# Patient Record
Sex: FEMALE | Race: ASIAN | URBAN - METROPOLITAN AREA
[De-identification: names, ages, dates, MRNs, and addresses within clinical notes are randomized per-mention and may not be internally consistent; named-entity substitution may affect disease eponyms.]

---

## 2017-03-14 ENCOUNTER — COMMUNICATION - HEALTHEAST (OUTPATIENT)
Dept: ADMINISTRATIVE | Facility: CLINIC | Age: 37
End: 2017-03-14

## 2017-03-30 ENCOUNTER — PRENATAL OFFICE VISIT - HEALTHEAST (OUTPATIENT)
Dept: MIDWIFE SERVICES | Facility: CLINIC | Age: 37
End: 2017-03-30

## 2017-03-30 DIAGNOSIS — Z34.81 ENCOUNTER FOR SUPERVISION OF OTHER NORMAL PREGNANCY, FIRST TRIMESTER: ICD-10-CM

## 2017-03-30 LAB — HIV 1+2 AB+HIV1 P24 AG SERPL QL IA: NEGATIVE

## 2017-03-30 ASSESSMENT — MIFFLIN-ST. JEOR: SCORE: 1471.01

## 2017-03-31 LAB
HBV SURFACE AG SERPL QL IA: NEGATIVE
HCV AB SERPL QL IA: NEGATIVE
SYPHILIS RPR SCREEN - HISTORICAL: NORMAL

## 2017-04-06 ENCOUNTER — HOSPITAL ENCOUNTER (OUTPATIENT)
Dept: ULTRASOUND IMAGING | Facility: CLINIC | Age: 37
Discharge: HOME | End: 2017-04-06
Attending: ADVANCED PRACTICE MIDWIFE

## 2017-04-06 DIAGNOSIS — Z34.81 ENCOUNTER FOR SUPERVISION OF OTHER NORMAL PREGNANCY, FIRST TRIMESTER: ICD-10-CM

## 2017-04-19 ENCOUNTER — AMBULATORY - HEALTHEAST (OUTPATIENT)
Dept: OBGYN | Facility: CLINIC | Age: 37
End: 2017-04-19

## 2017-05-11 ENCOUNTER — PRENATAL OFFICE VISIT - HEALTHEAST (OUTPATIENT)
Dept: MIDWIFE SERVICES | Facility: CLINIC | Age: 37
End: 2017-05-11

## 2017-05-11 DIAGNOSIS — O09.512 ADVANCED MATERNAL AGE, 1ST PREGNANCY, SECOND TRIMESTER: ICD-10-CM

## 2017-05-11 DIAGNOSIS — Z34.82 ENCOUNTER FOR SUPERVISION OF OTHER NORMAL PREGNANCY, SECOND TRIMESTER: ICD-10-CM

## 2017-05-11 ASSESSMENT — MIFFLIN-ST. JEOR: SCORE: 1474.41

## 2017-05-30 ENCOUNTER — COMMUNICATION - HEALTHEAST (OUTPATIENT)
Dept: ADMINISTRATIVE | Facility: CLINIC | Age: 37
End: 2017-05-30

## 2017-06-16 ENCOUNTER — AMBULATORY - HEALTHEAST (OUTPATIENT)
Dept: MIDWIFE SERVICES | Facility: CLINIC | Age: 37
End: 2017-06-16

## 2017-06-16 DIAGNOSIS — Z34.82 ENCOUNTER FOR SUPERVISION OF OTHER NORMAL PREGNANCY, SECOND TRIMESTER: ICD-10-CM

## 2017-07-06 ENCOUNTER — PRENATAL OFFICE VISIT - HEALTHEAST (OUTPATIENT)
Dept: MIDWIFE SERVICES | Facility: CLINIC | Age: 37
End: 2017-07-06

## 2017-07-06 DIAGNOSIS — Z34.82 ENCOUNTER FOR SUPERVISION OF OTHER NORMAL PREGNANCY, SECOND TRIMESTER: ICD-10-CM

## 2017-07-06 DIAGNOSIS — N89.8 VAGINAL IRRITATION: ICD-10-CM

## 2017-07-06 ASSESSMENT — MIFFLIN-ST. JEOR: SCORE: 1524.31

## 2017-07-10 ENCOUNTER — RECORDS - HEALTHEAST (OUTPATIENT)
Dept: ADMINISTRATIVE | Facility: OTHER | Age: 37
End: 2017-07-10

## 2017-07-13 ENCOUNTER — AMBULATORY - HEALTHEAST (OUTPATIENT)
Dept: MIDWIFE SERVICES | Facility: CLINIC | Age: 37
End: 2017-07-13

## 2017-08-03 ENCOUNTER — PRENATAL OFFICE VISIT - HEALTHEAST (OUTPATIENT)
Dept: MIDWIFE SERVICES | Facility: CLINIC | Age: 37
End: 2017-08-03

## 2017-08-03 DIAGNOSIS — Z34.83 ENCOUNTER FOR SUPERVISION OF OTHER NORMAL PREGNANCY, THIRD TRIMESTER: ICD-10-CM

## 2017-08-03 DIAGNOSIS — O99.013 ANTEPARTUM ANEMIA, THIRD TRIMESTER: ICD-10-CM

## 2017-08-03 ASSESSMENT — MIFFLIN-ST. JEOR: SCORE: 1516.37

## 2017-08-04 LAB — SYPHILIS RPR SCREEN - HISTORICAL: NORMAL

## 2017-08-19 ENCOUNTER — HEALTH MAINTENANCE LETTER (OUTPATIENT)
Age: 37
End: 2017-08-19

## 2017-08-31 ENCOUNTER — PRENATAL OFFICE VISIT - HEALTHEAST (OUTPATIENT)
Dept: MIDWIFE SERVICES | Facility: CLINIC | Age: 37
End: 2017-08-31

## 2017-08-31 DIAGNOSIS — B37.31 YEAST VAGINITIS: ICD-10-CM

## 2017-08-31 DIAGNOSIS — Z34.83 ENCOUNTER FOR SUPERVISION OF OTHER NORMAL PREGNANCY, THIRD TRIMESTER: ICD-10-CM

## 2017-08-31 DIAGNOSIS — N89.8 VAGINAL IRRITATION: ICD-10-CM

## 2017-08-31 ASSESSMENT — MIFFLIN-ST. JEOR: SCORE: 1535.65

## 2017-09-14 ENCOUNTER — PRENATAL OFFICE VISIT - HEALTHEAST (OUTPATIENT)
Dept: MIDWIFE SERVICES | Facility: CLINIC | Age: 37
End: 2017-09-14

## 2017-09-14 DIAGNOSIS — Z34.83 ENCOUNTER FOR SUPERVISION OF OTHER NORMAL PREGNANCY, THIRD TRIMESTER: ICD-10-CM

## 2017-09-14 DIAGNOSIS — O99.013 ANTEPARTUM ANEMIA, THIRD TRIMESTER: ICD-10-CM

## 2017-09-28 ENCOUNTER — PRENATAL OFFICE VISIT - HEALTHEAST (OUTPATIENT)
Dept: MIDWIFE SERVICES | Facility: CLINIC | Age: 37
End: 2017-09-28

## 2017-09-28 DIAGNOSIS — Z34.83 ENCOUNTER FOR SUPERVISION OF OTHER NORMAL PREGNANCY, THIRD TRIMESTER: ICD-10-CM

## 2017-09-28 ASSESSMENT — MIFFLIN-ST. JEOR: SCORE: 1570.8

## 2017-09-29 ENCOUNTER — COMMUNICATION - HEALTHEAST (OUTPATIENT)
Dept: OBGYN | Facility: CLINIC | Age: 37
End: 2017-09-29

## 2017-10-05 ENCOUNTER — PRENATAL OFFICE VISIT - HEALTHEAST (OUTPATIENT)
Dept: MIDWIFE SERVICES | Facility: CLINIC | Age: 37
End: 2017-10-05

## 2017-10-05 DIAGNOSIS — O99.019 ANTEPARTUM ANEMIA: ICD-10-CM

## 2017-10-05 DIAGNOSIS — Z34.83 ENCOUNTER FOR SUPERVISION OF OTHER NORMAL PREGNANCY IN THIRD TRIMESTER: ICD-10-CM

## 2017-10-05 ASSESSMENT — MIFFLIN-ST. JEOR: SCORE: 1571.71

## 2017-10-06 ENCOUNTER — PRENATAL OFFICE VISIT - HEALTHEAST (OUTPATIENT)
Dept: MIDWIFE SERVICES | Facility: CLINIC | Age: 37
End: 2017-10-06

## 2017-10-06 DIAGNOSIS — O99.013 ANEMIA DURING PREGNANCY IN THIRD TRIMESTER: ICD-10-CM

## 2017-10-06 ASSESSMENT — MIFFLIN-ST. JEOR: SCORE: 1569.89

## 2017-10-07 LAB
BASOPHILS # BLD AUTO: 0 THOU/UL (ref 0–0.2)
BASOPHILS NFR BLD AUTO: 0 % (ref 0–2)
EOSINOPHIL # BLD AUTO: 0 THOU/UL (ref 0–0.4)
EOSINOPHIL NFR BLD AUTO: 0 % (ref 0–6)
ERYTHROCYTE [DISTWIDTH] IN BLOOD BY AUTOMATED COUNT: 13.7 % (ref 11–14.5)
HCT VFR BLD AUTO: 29.6 % (ref 35–47)
HGB BLD-MCNC: 9.4 G/DL (ref 12–16)
LYMPHOCYTES # BLD AUTO: 1.2 THOU/UL (ref 0.8–4.4)
LYMPHOCYTES NFR BLD AUTO: 16 % (ref 20–40)
MCH RBC QN AUTO: 27.3 PG (ref 27–34)
MCHC RBC AUTO-ENTMCNC: 31.8 G/DL (ref 32–36)
MCV RBC AUTO: 86 FL (ref 80–100)
MONOCYTES # BLD AUTO: 0.6 THOU/UL (ref 0–0.9)
MONOCYTES NFR BLD AUTO: 8 % (ref 2–10)
NEUTROPHILS # BLD AUTO: 5.8 THOU/UL (ref 2–7.7)
NEUTROPHILS NFR BLD AUTO: 76 % (ref 50–70)
PATH REPORT.MICROSCOPIC SPEC OTHER STN: ABNORMAL
PLATELET # BLD AUTO: 223 THOU/UL (ref 140–440)
PMV BLD AUTO: 10.9 FL (ref 8.5–12.5)
RBC # BLD AUTO: 3.44 MILL/UL (ref 3.8–5.4)
WBC: 7.7 THOU/UL (ref 4–11)

## 2017-10-09 ENCOUNTER — AMBULATORY - HEALTHEAST (OUTPATIENT)
Dept: OBGYN | Facility: CLINIC | Age: 37
End: 2017-10-09

## 2017-10-09 DIAGNOSIS — O99.013 ANEMIA DURING PREGNANCY IN THIRD TRIMESTER: ICD-10-CM

## 2017-10-09 LAB
LAB AP CHARGES (HE HISTORICAL CONVERSION): NORMAL
PATH REPORT.COMMENTS IMP SPEC: NORMAL
PATH REPORT.COMMENTS IMP SPEC: NORMAL
PATH REPORT.FINAL DX SPEC: NORMAL
PATH REPORT.MICROSCOPIC SPEC OTHER STN: NORMAL
PATH REPORT.RELEVANT HX SPEC: NORMAL

## 2017-10-10 ENCOUNTER — COMMUNICATION - HEALTHEAST (OUTPATIENT)
Dept: ADMINISTRATIVE | Facility: CLINIC | Age: 37
End: 2017-10-10

## 2017-10-10 ENCOUNTER — INFUSION - HEALTHEAST (OUTPATIENT)
Dept: INFUSION THERAPY | Facility: CLINIC | Age: 37
End: 2017-10-10

## 2017-10-10 DIAGNOSIS — O99.019 IRON DEFICIENCY ANEMIA OF PREGNANCY: ICD-10-CM

## 2017-10-10 DIAGNOSIS — D50.9 IRON DEFICIENCY ANEMIA OF PREGNANCY: ICD-10-CM

## 2017-10-12 ENCOUNTER — PRENATAL OFFICE VISIT - HEALTHEAST (OUTPATIENT)
Dept: MIDWIFE SERVICES | Facility: CLINIC | Age: 37
End: 2017-10-12

## 2017-10-12 DIAGNOSIS — O99.013 ANEMIA DURING PREGNANCY IN THIRD TRIMESTER: ICD-10-CM

## 2017-10-12 DIAGNOSIS — O99.019 ANTEPARTUM ANEMIA: ICD-10-CM

## 2017-10-12 DIAGNOSIS — O09.512 ADVANCED MATERNAL AGE, 1ST PREGNANCY, SECOND TRIMESTER: ICD-10-CM

## 2017-10-12 ASSESSMENT — MIFFLIN-ST. JEOR: SCORE: 1572.16

## 2017-10-13 ENCOUNTER — INFUSION - HEALTHEAST (OUTPATIENT)
Dept: INFUSION THERAPY | Facility: CLINIC | Age: 37
End: 2017-10-13

## 2017-10-13 DIAGNOSIS — O99.019 IRON DEFICIENCY ANEMIA OF PREGNANCY: ICD-10-CM

## 2017-10-13 DIAGNOSIS — D50.9 IRON DEFICIENCY ANEMIA OF PREGNANCY: ICD-10-CM

## 2017-10-16 ENCOUNTER — INFUSION - HEALTHEAST (OUTPATIENT)
Dept: INFUSION THERAPY | Facility: CLINIC | Age: 37
End: 2017-10-16

## 2017-10-16 DIAGNOSIS — O99.019 IRON DEFICIENCY ANEMIA OF PREGNANCY: ICD-10-CM

## 2017-10-16 DIAGNOSIS — D50.9 IRON DEFICIENCY ANEMIA OF PREGNANCY: ICD-10-CM

## 2017-10-18 ENCOUNTER — INFUSION - HEALTHEAST (OUTPATIENT)
Dept: INFUSION THERAPY | Facility: CLINIC | Age: 37
End: 2017-10-18

## 2017-10-18 DIAGNOSIS — O99.019 IRON DEFICIENCY ANEMIA OF PREGNANCY: ICD-10-CM

## 2017-10-18 DIAGNOSIS — D50.9 IRON DEFICIENCY ANEMIA OF PREGNANCY: ICD-10-CM

## 2017-10-19 ENCOUNTER — PRENATAL OFFICE VISIT - HEALTHEAST (OUTPATIENT)
Dept: MIDWIFE SERVICES | Facility: CLINIC | Age: 37
End: 2017-10-19

## 2017-10-19 DIAGNOSIS — O99.019 ANTEPARTUM ANEMIA: ICD-10-CM

## 2017-10-19 DIAGNOSIS — Z34.83 ENCOUNTER FOR SUPERVISION OF OTHER NORMAL PREGNANCY IN THIRD TRIMESTER: ICD-10-CM

## 2017-10-20 ENCOUNTER — INFUSION - HEALTHEAST (OUTPATIENT)
Dept: INFUSION THERAPY | Facility: CLINIC | Age: 37
End: 2017-10-20

## 2017-10-20 DIAGNOSIS — O99.019 IRON DEFICIENCY ANEMIA OF PREGNANCY: ICD-10-CM

## 2017-10-20 DIAGNOSIS — D50.9 IRON DEFICIENCY ANEMIA OF PREGNANCY: ICD-10-CM

## 2017-10-26 ENCOUNTER — AMBULATORY - HEALTHEAST (OUTPATIENT)
Dept: MIDWIFE SERVICES | Facility: CLINIC | Age: 37
End: 2017-10-26

## 2017-10-26 ENCOUNTER — PRENATAL OFFICE VISIT - HEALTHEAST (OUTPATIENT)
Dept: MIDWIFE SERVICES | Facility: CLINIC | Age: 37
End: 2017-10-26

## 2017-10-26 DIAGNOSIS — O48.0 POST-TERM PREGNANCY, 40-42 WEEKS OF GESTATION: ICD-10-CM

## 2017-10-26 ASSESSMENT — MIFFLIN-ST. JEOR: SCORE: 1578.51

## 2017-10-29 ENCOUNTER — AMBULATORY - HEALTHEAST (OUTPATIENT)
Dept: OBGYN | Facility: CLINIC | Age: 37
End: 2017-10-29

## 2017-10-29 ENCOUNTER — HOSPITAL ENCOUNTER (OUTPATIENT)
Dept: MEDSURG UNIT | Facility: CLINIC | Age: 37
Discharge: HOME | End: 2017-10-29
Attending: ADVANCED PRACTICE MIDWIFE | Admitting: MIDWIFE

## 2017-10-29 ENCOUNTER — HOSPITAL ENCOUNTER (OUTPATIENT)
Dept: ULTRASOUND IMAGING | Facility: CLINIC | Age: 37
Discharge: HOME | End: 2017-10-29
Attending: MIDWIFE

## 2017-10-29 DIAGNOSIS — O48.0 POST-TERM PREGNANCY, 40-42 WEEKS OF GESTATION: ICD-10-CM

## 2017-10-29 ASSESSMENT — MIFFLIN-ST. JEOR: SCORE: 1575.34

## 2017-11-01 ENCOUNTER — COMMUNICATION - HEALTHEAST (OUTPATIENT)
Dept: OBGYN | Facility: CLINIC | Age: 37
End: 2017-11-01

## 2017-11-01 ENCOUNTER — HOSPITAL ENCOUNTER (OUTPATIENT)
Dept: MEDSURG UNIT | Facility: CLINIC | Age: 37
Discharge: HOME | End: 2017-11-01
Attending: ADVANCED PRACTICE MIDWIFE | Admitting: MIDWIFE

## 2017-11-03 ENCOUNTER — HOME CARE/HOSPICE - HEALTHEAST (OUTPATIENT)
Dept: HOME HEALTH SERVICES | Facility: HOME HEALTH | Age: 37
End: 2017-11-03

## 2017-11-07 ENCOUNTER — COMMUNICATION - HEALTHEAST (OUTPATIENT)
Dept: OBGYN | Facility: CLINIC | Age: 37
End: 2017-11-07

## 2017-11-30 ENCOUNTER — COMMUNICATION - HEALTHEAST (OUTPATIENT)
Dept: MIDWIFE SERVICES | Facility: CLINIC | Age: 37
End: 2017-11-30

## 2017-12-14 ENCOUNTER — OFFICE VISIT - HEALTHEAST (OUTPATIENT)
Dept: MIDWIFE SERVICES | Facility: CLINIC | Age: 37
End: 2017-12-14

## 2017-12-14 ASSESSMENT — MIFFLIN-ST. JEOR: SCORE: 1489.15

## 2019-11-06 ENCOUNTER — HEALTH MAINTENANCE LETTER (OUTPATIENT)
Age: 39
End: 2019-11-06

## 2020-11-29 ENCOUNTER — HEALTH MAINTENANCE LETTER (OUTPATIENT)
Age: 40
End: 2020-11-29

## 2021-05-30 VITALS — HEIGHT: 66 IN | WEIGHT: 174.75 LBS | BODY MASS INDEX: 28.09 KG/M2

## 2021-05-30 VITALS — WEIGHT: 174 LBS | HEIGHT: 66 IN | BODY MASS INDEX: 27.97 KG/M2

## 2021-05-31 ENCOUNTER — RECORDS - HEALTHEAST (OUTPATIENT)
Dept: ADMINISTRATIVE | Facility: CLINIC | Age: 41
End: 2021-05-31

## 2021-05-31 VITALS — BODY MASS INDEX: 29.57 KG/M2 | WEIGHT: 184 LBS | HEIGHT: 66 IN

## 2021-05-31 VITALS — WEIGHT: 178 LBS | HEIGHT: 66 IN | BODY MASS INDEX: 28.61 KG/M2

## 2021-05-31 VITALS — BODY MASS INDEX: 31.47 KG/M2 | HEIGHT: 66 IN | WEIGHT: 195.8 LBS

## 2021-05-31 VITALS — WEIGHT: 197.7 LBS | HEIGHT: 66 IN | BODY MASS INDEX: 31.77 KG/M2

## 2021-05-31 VITALS — WEIGHT: 188.25 LBS | BODY MASS INDEX: 30.25 KG/M2 | HEIGHT: 66 IN

## 2021-05-31 VITALS — BODY MASS INDEX: 31.64 KG/M2 | WEIGHT: 196 LBS

## 2021-05-31 VITALS — BODY MASS INDEX: 29.85 KG/M2 | WEIGHT: 185.75 LBS | HEIGHT: 66 IN

## 2021-05-31 VITALS — BODY MASS INDEX: 31.55 KG/M2 | WEIGHT: 196.3 LBS | HEIGHT: 66 IN

## 2021-05-31 VITALS — WEIGHT: 196.2 LBS | BODY MASS INDEX: 31.53 KG/M2 | HEIGHT: 66 IN

## 2021-05-31 VITALS — BODY MASS INDEX: 31.66 KG/M2 | WEIGHT: 197 LBS | HEIGHT: 66 IN

## 2021-05-31 VITALS — HEIGHT: 66 IN | BODY MASS INDEX: 31.5 KG/M2 | WEIGHT: 196 LBS

## 2021-05-31 VITALS — BODY MASS INDEX: 30.18 KG/M2 | WEIGHT: 187 LBS

## 2021-06-01 ENCOUNTER — RECORDS - HEALTHEAST (OUTPATIENT)
Dept: ADMINISTRATIVE | Facility: CLINIC | Age: 41
End: 2021-06-01

## 2021-06-09 NOTE — PROGRESS NOTES
PRENATAL VISIT   FIRST OBSTETRICAL EXAM - OB    Assessment / Impression     First prenatal visit at approximately 10 weeks by uncertain LMP    Overweight, BMI 27. Discussed recommended weight gain of 15-25 lbs  Elevated EPDS of 19. Denies SI or HI; feels situational to early pregnancy.  History of depression; college, situational.     Plan:      *Ask partner's occupation at next visit*    -IOB labs drawn.  -Pt is not interested in drawing lead level. MDH screen negative.  -**Continue to monitor EPDS and signs of depression**  -Patient is  interested in waterbirth.  HIV and Hep C drawn today. Patient is interested in water birth.  -Reviewed prenatal care schedule.  -Optimal nutrition and weight gain discussed. Pregnancy weight gain of 15-25 lbs (BMI 25.0-29.9) encouraged.   -Anticipatory guidance for common pregnancy questions and concerns reviewed.   -Danger s/sx for this trimester reviewed with patient.  -Reviewed genetic screening options with patient, patient does not elect for first trimester screening at this time but will continue to consider in the coming weeks and will let us know in next 2-3 weeks if she desires genetic testing.  -IOB packet given and reviewed with patient.  -CNM services and hospital options reviewed; emergency and scheduling phone numbers given to patient.  -Return to clinic 4 weeks    Total time spent with patient: 40 minutes, >50% time spent counseling and coordinating care.    Subjective:    Kay Posada is a 36 y.o.  here today for her First Obstetrical Exam. Notes some nausea and vomiting but states that it isn't so significant that it impacts her day to day. States that she has been taking some antoinette supplements without much improvement. She states that she has throw up 6-8 times in this pregnancy so far.     Education level: Post-graduate; chiropractor  Occupation: Chiropractor  Partners name: Joseph  Partners occupation: Will ask at [place]    OB History     "Para Term  AB SAB TAB Ectopic Multiple Living   3 2 2 0 0 0 0 0 0 2      # Outcome Date GA Lbr Anthony/2nd Weight Sex Delivery Anes PTL Lv   3 Current            2 Term 12 41w4d  7 lb 8 oz (3.402 kg) M Water Birth None N Y   1 Term 09 41w5d  7 lb 6 oz (3.345 kg) F Vag-Spont None N Y          Expected Date of Delivery: Not found.    Past Medical History:   Diagnosis Date     Chicken pox as a child      [facility]; treated with medication. Situational     Past Surgical History:   Procedure Laterality Date     WISDOM TOOTH EXTRACTION      No anesthesia problems     Social History   Substance Use Topics     Smoking status: Former Smoker     Years: 0.50     Quit date: 2002     Smokeless tobacco: Never Used     Alcohol use No      Comment: None since finding out since pregnant     Current Outpatient Prescriptions   Medication Sig Dispense Refill     ascorbic acid (VITAMIN C) 1000 MG tablet Take 1,000 mg by mouth daily.       b complex vitamins tablet Take 1 tablet by mouth daily.       cholecalciferol, vitamin D3, 1,000 unit/drop Drop Take by mouth.       calcium-vitamin D (CALCIUM-VITAMIN D) 500 mg(1,250mg) -200 unit per tablet Take 1 tablet by mouth 2 (two) times a day with meals.       cholecalciferol, vitamin D3, 5,000 unit Tab Take 2,000 Units by mouth daily.        choline,magnesium salicylate 500 mg/5 mL Liqd liquid Take 500 mg by mouth 3 (three) times a day.       omega-3 fatty acids-fish oil (FISH OIL) 340-1,000 mg cap Take by mouth.       prenatal #115-iron-folic acid 29 mg iron- 1 mg Chew Chew daily.       No current facility-administered medications for this visit.      No Known Allergies          Pregnancy Risk Factors: Age is <18 or >35    EPDS score today: 19. States that she is reflecting over the last week and feeling nauseated, fatigued and \"just not herself\" in early pregnancy. She states that she does not feel depressed and denies any SI or HI. Feels that this early " "pregnancy nausea and fatigue is really affecting her. Patient also noting that this pregnancy wasn't necessarily planned although Joseph and her are happy. She feels as though she is still 'catching up' feeling pregnant with this third baby. Notes a history of depression in college in which she took medications but states it was more situational in college and hasn't felt depressed since that time. Denies any history of postpartum depression.    Review of Systems  General:  Denies problem  Eyes: Denies problem  Ears/Nose/Throat: Denies problem  Cardiovascular: Denies problem  Respiratory:  Denies problem  Gastrointestinal:  Denies problem  Genitourinary: Denies problem  Musculoskeletal:  Denies problem  Skin: Denies problem  Neurologic: Denies problem  Psychiatric: Denies problem  Endocrine: Denies problem  Heme/Lymphatic: Denies problem   Allergic/Immunologic: Denies problem       Objective:   Objective    Vitals:    03/30/17 1044   BP: 104/62   Pulse: 72   Weight: 174 lb (78.9 kg)   Height: 5' 6\" (1.676 m)     Physical Exam:  General Appearance: Alert, cooperative, no distress, appears stated age  Head: Normocephalic, without obvious abnormality, atraumatic  Eyes: Conjunctiva/corneas clear, does wear corrective lenses  Neck: Supple, symmetrical, trachea midline, no adenopathy  Thyroid: not enlarged, symmetric, no tenderness/mass/nodules  Back: Symmetric, no curvature, ROM normal, no CVA tenderness  Lungs: Clear to auscultation bilaterally, respirations unlabored  Heart: Regular rate and rhythm, S1 and S2 normal, no murmur, rub, or gallop  Breasts: No breast masses, tenderness, asymmetry, or nipple discharge. Nipples are everted.  Abdomen: Soft, non-tender, bowel sounds active all four quadrants, no masses.   FHT: Unable to perform today at 8-9 week gestation.   Vulva:  No sign of lesions or condyloma, normal hair distribution  Vagina: pink, normal rugae, no abnormal discharge  Cervix: Long/thick/closed, no lesions " or inflammation noted, negative CMT with exam  Uterus: Anterior position, non tender, enlarged approximately 9 weeks size  Adnexa: No masses appreciated, non-tender with palpation  Pelvic spines:AVERAGE  Sacrum:ANTERIOR  Suprapubic Arch:NORMAL  Pelvis type:gynecoid            Extremities: Extremities normal, atraumatic, no cyanosis or edema  Skin: Skin color, texture, turgor normal, no rashes or lesions  Lymph nodes: Cervical, supraclavicular, and axillary nodes normal  Neurologic: Alert and oriented x 3.    Lab:   Results for orders placed or performed in visit on 12/04/14   Lipid Profile   Result Value Ref Range    Triglycerides 122 <=149 mg/dL    Cholesterol 214 (H) <=199 mg/dL    LDL Calculated 121 0 - 129 mg/dL    HDL Cholesterol 69 >=40 mg/dL   Vitamin D, Total (25-Hydroxy)   Result Value Ref Range    Vitamin D, Total (25-Hydroxy) 17.8 (L) 30.0 - 80.0 ng/mL   PAP - Gynecologic Cytology (PAP Smear)   Result Value Ref Range    Case Report       Gynecologic Cytology Report                       Case: [id number]                                   --------------------------------------------------------------------------------------------------  Authorizing Provider:  Chelsie Smith CNM       Ordering Provider:                                Ordering Location:     [facility]  Collected:           12/4/2014 1340               First Screen:          HALEY Walton       Received:            12/5/2014 0909                                      (ASCP)                                                                                                                                                                           Specimen:    SUREPATH PAP, SCRN REGARDLESS, Endocervical/cervical                                       Interpretation       Negative for squamous intraepithelial lesion or malignancy    Result Flag Normal Normal    Specimen Adequacy       Satisfactory for evaluation,  endocervical/transformation zone component present    HPV Reflex? Yes regardless of result     HIGH RISK No     Abnormal Bleeding No     Pt Status health maintenance     Birth Control/Hormones None     Prev Abn Date/Dx None     Cervical Appearance parous os, clean    HPV Cascade (PCR)   Result Value Ref Range    Interpretation No HPV Type(s) Detected No HPV Type(s) Detected, No High Risk HPV Type(s) Detected, DNA Quantity Not Sufficient     Sedrick Gilman MD, [facility]         AJAY Garsia, STEPHANIE   3/30/2017 11:54 AM

## 2021-06-10 NOTE — PROGRESS NOTES
Kay is here with her son and 2 other children she is caring for. She is slowly feeling much better 'and not crying nearly so much.' PHQ 9 today = 6. She is working PT as a chiropractor and doing  PT. I offered QS which she will consider but didn't desire doing it today. We reviewed her IOB labs. She is taking a B complex vitamin and till try to incorporate her PNV and choline soon. Only occasional days with N/V now. We reviewed her dating US and because she was unsure of exact LMP she feels this is the most accurate EDC. I offered an US with MPP for AMA which she accepted and a referral will be placed. She may check with insurance to make sure it is covered. Probably early quickening.

## 2021-06-11 NOTE — PROGRESS NOTES
STEPHANIE has called Kay to ask if she wants [facility] Level 2 US. Left messages and calls not returned.  AJAY Paredes,STEPHANIE

## 2021-06-11 NOTE — PROGRESS NOTES
Kay is here with 9 mo old Ethan, her friend's baby who she cares for. States 2 weeks ago she finally started feeling well with no recent emesis. She plans to return to taking her prenatal supplements again. (which she hasn't been taking). 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 She then travels to [place] every other week-end and works there 1 1/2 days. [facility] she finally connected with [facility] and her Level 2 US is scheduled 7/10/17. She may take her family and they plan for gender to be a surprise. She is having mild stress incontinence with coughing and is wearing pads. Feels some skin irritation and we sent a wet prep today. Labia slight external genitalia inflammation. Feels slight pelvic pressure so cervix checked: Firm, posterior, LTC, no bulging EVA. Slight anterior relaxation with bearing down. Encouraged Kegels. Baby is active. Slightly S>D today. Reviewed weight gain with 11# gain past 2 months. On target with BMI. NV: 1 hr GCT, Hepatitis C, Hgb, RPR. Info given: TDap, vaccines,  and 1 hr GCT. Wet prep = yeast. Kay notified and [facility] usually treats yeast with garlic cloves vaginally which she will use as well as decrease dietary sugar.

## 2021-06-12 NOTE — PROGRESS NOTES
Kay is here for CARLOS visit. 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 States she has eaten really healhily for the past month and then increased sugar in the last week.  She started having external genital itching x 3 days and it is quite bothersome. May try garlic. Wet prep positive yeast and I called and recommended Monistat 7 as her EG are quite inflamed and discharge is chunky white.  Taking Floradix and purchased chlorophyll. Discussed hemoglobin should be higher than 9.4 when she desires WB. She will transition to MPW - encouraged regular appointments.

## 2021-06-12 NOTE — PROGRESS NOTES
Kay is here with her children. She is tired and a little lightheaded. She is doing the  1 hr GCT, RPR and hemoglobin today. We reviewed her targeted US and she was very happy with the results. She did not have the fetal ECHO as Dr Dahl felt the cardiac structures were normal. 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 Then had IC and symptoms slightly recurred so she used it again. She is feeling slight external genital soreness probably secondary to small amount stress incontinence. Will get UC along with 1 hr GCT, RPR and Hgb. Encouraged air drying. CNM called and LM with 1 hr GCT = 129 and Hemoglobin 9.4. I recommended Floradix or Slow FE plus high iron foods.

## 2021-06-13 NOTE — PROGRESS NOTES
Kay is here today with Jorge and Joseph, the toddler that she cares for. Noticed a hemorrhoid over the w/e - using liquid witch hazel which is helpful. Hemorrhoid is about .5 cm, pink and soft. Kay is taking FloradixTID and chlorophyll BID. She had #1 IV venofer 2 days ago and is scheduled for #2 venofer tomorrow. She has 3 appointments scheduled every other day next week. Desires hemoglobin check today. State when she went up the stairs she didn't have to lie down and rest. CX posterior, soft, EO 1cm, IO closed, long, long, -3. Normal FM. Hemoglobin today = 9.2 - message left on Kay's AM.

## 2021-06-13 NOTE — PROGRESS NOTES
Pt presented for 2nd of 5 iron infusions. Pt pregnant and due in the next week she says. Pt is scheduled for mwf next week. Pt very tired and hopes to deliver soon. Pt tolerated infusion well. Discharged home after iv discont. Lyudmila Dumont RN

## 2021-06-13 NOTE — PROGRESS NOTES
Kay is here for her 34 wk CARLOS visit. She is tired and is craving ice. We discussed the nature of pica and its association with low iron. She is taking Floradix 10cc po daily and chlorophyll 1 daily. We checked her hemoglobin today and it went up to 10.0 from 9.4 on 8/3/17. She will increase her Floradix and chlorophyll. She denies craving chalk, corn starch or dirt.  She was given an RX for a breast pump and she will explore which pump her insurance covers. She completed course of Monistat and her symptoms of yeast vaginitis resolved. She has stopped doing her [facility] and is doing minimal chiropractic appointments. 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 Sign WB consent at [place]. Declined influenza vaccine.

## 2021-06-13 NOTE — PROGRESS NOTES
Kay is here with her son Jorge and the little girl she cares for. 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 Called re: breast pump and will take RX to recommended pharmacy probably for Spectra breast pump. Symptoms of yeast infection resolved. Kay will check with insurance regarding circumcision.  [facility] pediatric[facility]. WB consent signed. Will check Hgb in next couple weeks. Increase Floradix to BID. Still craving ice. Will have 6 week KAITLIN. 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

## 2021-06-13 NOTE — PROGRESS NOTES
Pt to L&D for NST after BPP for postdates, , 41 weeks. Brought to 0 vital signs WNL, EFM applied, very active baby per mom and hard to keep on the monitor.

## 2021-06-13 NOTE — PROGRESS NOTES
Patient in triage this afternoon for BPP and NST at 41 weeks 0 days.  See results below.        BIOPHYSICAL PROFILE  10/29/2017 2:00 PM    INDICATION: 41 weeks gestation. [facility] radiology  COMPARISON: 4/6/2017.    FINDINGS:  Single living fetus, vertex presentation.  Heart rate of 167 beats per minute.  PEACE 7 cm.  Placenta location fundal.    0/2 fetal breathing  2/2 fetal movements  2/2 fetal tone  2/2 amniotic fluid     Total biophysical profile 8/8             Impression             CONCLUSION:  6/8 biophysical profile. Zero points awarded for fetal breathing as less than 30 seconds of fetal breathing movements detected in 30 minutes of observation.              BPP 6 out of 8 with a category 1 fetal heart rate tracing.  Moderate variability with greater than 2 accelerations present in a 20 minute period.  No decelerations noted.  BPP plus NST equals a score of 8 out of 10.  Normal PEACE of 7.  Patient safe to go home and follow-up in clinic at 41-1/2 weeks.  Patient states to Lyubov RAMOS that she has a CNM appointment this upcoming Thursday when she is 41 weeks 4 days.      AJAY Brewster,STEPHANIE

## 2021-06-13 NOTE — PROGRESS NOTES
NST and BPP reviewed with STEPHANIE Eduardo, BPP 6/8 for 0 breathing, PEACE 7, pt okay to d/c to home, discharge instructions reviewed, all questions answered, pt d/c to home.

## 2021-06-13 NOTE — PROGRESS NOTES
Patient arrived ambulatory for first of 5 venofer infusions. IV access obtained and patient tolerated IV infusion with no complaints/no signs of adverse reaction. IV removed for discharge to home with hemostasis achieved. Patient reports will return Friday for next infusion. Discharged ambulatory to home.
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
## 2021-06-13 NOTE — PROGRESS NOTES
Kay is here alone for a CARLOS visit. 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  Increased chlorophyll and not Floradix. Wishes hemoglobin checked today. Hopes to get Spectra and insurance gave information where she may get pump. RX given. This is last week of scheduled chiropractor appointments. Still craving ice. Having quite a few contractions which stop with rest. CX posterior, EO 1+cm, IO closed, long, firm at IO, -2 fixed. Reviewed negative GBS. Joseph is very busy with his work - catering, etc.

## 2021-06-13 NOTE — PROGRESS NOTES
"Pt admitted per pedis for her infusion of iron. States she is feeling well just tired but \"a little less tired than previously. Pt states she is due any day now. Denies any cramping or discomfort. Pt received iron infusion without difficulty and dcd home after  "

## 2021-06-13 NOTE — PROGRESS NOTES
Kay is here with spouse Joseph for CARLOS visit at 40 4/7 weeks. Feeling well. Reporting fetal movement felt decreased a couple days ago, however baby active today. Discussed NST today, and Kay and spouse would like to proceed. (NST reactive today. Discussed accuracy of EDC, and Kay's U/S reviewed. EDC verified with [facility] ultrasound which is 10/22/17.  Positions discussed for optimal fetal positioning including exercise ball, hand and knees, and lateral opposed to reclining. ROP by Le[place]olds today.  Would prefer waterbirth. Waterbirth consent signed.   Feeling much better since IV iron therapy (5 total), and continues to take floradix. Reports feeling more energy. Discussed drawing hemoglobin today.  Follow up NST and BPP 10/29/17 ordered.    Blanquita Pittman RN, (SNM)  AJAY Cervantes, CNM present entire visit and agree with assessment.

## 2021-06-13 NOTE — PROGRESS NOTES
Pt states she is noticing a difference in endurance and activity as of today.  Can now climb the steps without being as sob and fatiuged.

## 2021-06-13 NOTE — PROGRESS NOTES
Here for anemia panel laboratory workup.   She states she did not have problems with previous pregnancies.  She states she has not changed her diet or lifestyle much from when she was pregnant with her previous children, such as becoming a vegan or anything.  She denies any hematemesis, melena, vaginal bleeding or any other sources of blood loss.  Denies any injuries or abdominal pain or any other concerns today.  The patient states that she is taking Fluoridex once a day, which she considers to be her main iron supplementation.  She is taking chlorophyll 1-2 times a day, but over the last few weeks has been taking it more consistently twice a day.  She is not taking any iron supplement tab specifically, but rather using the Floranex as her iron supplementation.  She has been continuing to crave ice, but after talking with Chelsie Smith CNM yesterday, she states she has tried to reduce this urge.  We discussed that the chewing of eyes should not supersede the consumption of nutrition or intake of iron through dietary means. She has tried to increase her dietary intake of iron through food as well.  She states that she had steak last night.  She states she has not created any soil or eder.  We discussed having her start to increase her iron supplementation to 3 times a day while we wait for the results of this to take the iron alongside vitamin C and to try to avoid any calcium or dairy in the 30 minutes before or after taking the iron.  The patient states that she has some digestive issues already and some obstipation so we discussed using Colace or magnesium to assist with having bowel movements.  We discussed that there is no harm in having her increase her iron supplementation until we have the final results of the anemia panel.  The patient is open to iron infusions and we discussed what this would look like.  The patient is interested in getting her iron level up to be eligible for water birth.  The patient  otherwise denies any other questions or concerns at this time.  The anemia panel was drawn including a CBC, serum ferritin, sickle cell screen, serum iron, peripheral smear, reticulocyte, and a TIBC.

## 2021-06-13 NOTE — PROGRESS NOTES
Pt came into infusion clinic for IV Iron as ordered. Medications explained to pt who verbalized understanding. IV patent throughout infusion. Pt tolerated infusion with no complications. Pt left infusion clinic via ambulatory.

## 2021-06-13 NOTE — PROGRESS NOTES
CNM called Kay with hemoglobin 8.8 today. States when she awakened she felt a little dizzy and very tired today. Advised to schedule CNM appointment tomorrow for anemia profile labs and consider IV iron infusion.

## 2021-06-14 NOTE — PROGRESS NOTES
6-week Postpartum Visit:   Subjective:    Kay is a 36 yo,  here for her 6 week postpartum exam. She is integrating birth experience/care and transition well. Had a waterbirth. Bleeding and uterine cramping have ceased. Denies pain. Reports normal bowel and bladder functioning. EPDS score 7 . Reports good family/friend support.  Breastfeeding well-established. Feeding [place] on demand.  Has returned to work this week and plans to integrate pumping prior to her return. Mobile chiro practice, and leaving baby this morning (her first day back) was a challenge.  Has not resumed intercourse. Discussed return to fertility. Denies pain or concerns. Plans to use condoms for contraception,  planning vasectomy.     Review of Systems  General:  Denies problem  Eyes: Denies problem  Ears/Nose/Throat: Denies problem  Cardiovascular: Denies problem  Respiratory:  Denies problem  Gastrointestinal:  Denies problem, Genitourinary: Denies problem  Musculoskeletal:  Denies problem  Skin: Denies problem  Neurologic: Denies problem  Psychiatric: Denies Problem  Endocrine: Denies problem     Objective:     Physical Exam:  General: Pleasant, articulate, well-groomed, well-nourished female.  Not in any apparent distress.  Neck: Supple.  Thyroid: Small, symmetrical, no nodules noted.  Lymphadenopathy: Negative.  Lungs: Clear to auscultation bilaterally, and a nonlabored breathing pattern.  Cardio: Regular rate and rhythm, negative for murmur.   Breasts: Symmetrical, nontender, no masses, negative lymphadenopathy, lactating  Abdomen: Soft, nontender, no masses, negative CVAT.  External genitalia: Normal hair distribution, no lesions.   Urethral opening: Without lesions, or tenderness.   Bladder: Without masses, or tenderness.  Vagina: Pink, rugated, normal-appearing discharge. 5 mm sebaceous cyst noted on right side, about 2 cm inside of vaginal introitus. 2nd degree repair appears healed.  Cervix: cl/thick, pink, smooth, no  lesions.    Bimanual: Finds uterus small, AF,  mobile, nontender, no masses.  Negative CMT.  Adnexa, without masses or tenderness.    Lower extremities: +1 reflexes, no significant edema.      Assessment:   Normal 6 week postpartum exam.     Lactating  H/O anemia with h/o iron infusions during pregnancy.    Plan:   - Reviewed warning signs of postpartum depression. [place]  Mental Health Resource List given for future reference prn.   -PP exercises reviewed and encouraged modified abdominal crunches and Kegels daily. Encouraged integrating formal exercise, such as walking 20-30mns daily.   -Warning signs of breast infections of mastitis and thrush reviewed. Breastfeeding support resource list and contact info given, including [facility] and [facility].   -Encouraged to continue with PNV, Vitamin D and DHA supplements.   - Return of fertility discussed. Plans condoms for contraception.   -Reviewed warning signs of pelvic pain, excessive bleeding or abdominal pain, fever/chills, or signs of breast infection.   -Labs today: Hgb =13.5  -RTC for routine health maintenance or sooner as needed.     TT with patient 40 mns, >50% time spent in counseling or coordination of care.     Blanquita Pittman RN, SNM  I was present for assessment, repeated heart, breast and pelvic exam and agree with findings.  Chelsie Smith APRN,CNM

## 2021-07-03 NOTE — ADDENDUM NOTE
Addendum Note by Chelsie Smith APRN, CNM at 9/28/2017  5:33 PM     Author: Chelsie Smith APRN, CNM Service: -- Author Type: Midwife    Filed: 9/28/2017  5:33 PM Encounter Date: 9/28/2017 Status: Signed    : Chelsie Smith APRN, CNM (Midwife)    Addended by: CHELSIE SMITH on: 9/28/2017 05:33 PM        Modules accepted: Orders

## 2021-07-14 PROBLEM — O09.512 ADVANCED MATERNAL AGE, 1ST PREGNANCY, SECOND TRIMESTER: Status: RESOLVED | Noted: 2017-05-11 | Resolved: 2017-12-14

## 2021-07-14 PROBLEM — Z37.9 NORMAL LABOR: Status: RESOLVED | Noted: 2017-11-01 | Resolved: 2017-11-01

## 2021-07-14 PROBLEM — O99.019 ANTEPARTUM ANEMIA: Status: RESOLVED | Noted: 2017-08-03 | Resolved: 2017-11-01

## 2021-07-14 PROBLEM — O48.0 POST TERM PREGNANCY: Status: RESOLVED | Noted: 2017-10-26 | Resolved: 2017-12-14

## 2021-07-14 PROBLEM — O99.019 IRON DEFICIENCY ANEMIA OF PREGNANCY: Status: RESOLVED | Noted: 2017-10-09 | Resolved: 2017-11-01

## 2021-07-14 PROBLEM — Z34.80 ENCOUNTER FOR SUPERVISION OF OTHER NORMAL PREGNANCY: Status: RESOLVED | Noted: 2017-03-30 | Resolved: 2017-12-14

## 2021-07-14 PROBLEM — O09.523 ADVANCED MATERNAL AGE IN MULTIGRAVIDA, THIRD TRIMESTER: Status: RESOLVED | Noted: 2017-05-11 | Resolved: 2017-05-11

## 2021-07-14 PROBLEM — D50.9 IRON DEFICIENCY ANEMIA OF PREGNANCY: Status: RESOLVED | Noted: 2017-10-09 | Resolved: 2017-11-01

## 2021-07-16 NOTE — PROGRESS NOTES
Encouraged to discontinue ice if possible and she will increase floradix also.   Left message for patient to call back.

## 2021-09-19 ENCOUNTER — HEALTH MAINTENANCE LETTER (OUTPATIENT)
Age: 41
End: 2021-09-19

## 2022-01-09 ENCOUNTER — HEALTH MAINTENANCE LETTER (OUTPATIENT)
Age: 42
End: 2022-01-09

## 2022-11-21 ENCOUNTER — HEALTH MAINTENANCE LETTER (OUTPATIENT)
Age: 42
End: 2022-11-21

## 2023-04-16 ENCOUNTER — HEALTH MAINTENANCE LETTER (OUTPATIENT)
Age: 43
End: 2023-04-16

## 2024-02-04 ENCOUNTER — HEALTH MAINTENANCE LETTER (OUTPATIENT)
Age: 44
End: 2024-02-04